# Patient Record
(demographics unavailable — no encounter records)

---

## 2024-10-10 NOTE — ASSESSMENT
[FreeTextEntry1] : Diabetes, at goal. (A1c 7.0%). Obesity BMI 36 change Trulicity to Ozempic 2mg/week to help reduce weight.  sample pen given for 0.5mg Ozempic,  first dose given today in office, take last dose of Trulicity tomorrow, next week, take 1mg of Ozempic, then titrate to 2mg once pen is finished. restart glimepiride.  Bring meter to next visit. Continue regular walking continue statin therapy flu vaccine given, R deltoid RTO 4 months

## 2024-10-10 NOTE — HISTORY OF PRESENT ILLNESS
[FreeTextEntry1] : here with daughter (she is helping translate). He did not bring meter or log book to visit.   Sugars have been ok, 1790-180s in the mornings. He is still taking Trulicity, he did not get Mounjaro or glimepiride from pharmacy. He feels hungry at night but tries not to eat much, will eat fruit. He walks every day for 30 minutes.  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 not in his bag of meds) [id number] bid, cyclobenzaprine 5mg q12h atorvastatin 20mg Xofaxan 550mg pantoprazole Contour next strips, testing one/day

## 2024-10-21 NOTE — ASSESSMENT
[FreeTextEntry1] : 46 yo M with decompensated ETOH cirrhosis (EV s/p mult bleeds, EV banding and TIPS placement 10/16/23. No ascites, HE. No HCC. MELD 8 [ABO O], liver evaluation closed d/t pt status improving, here for f/u  Cirrhosis/HCC screening MRI 6/8/24, cirr, no HCC, DUE DEC, ordered ABDL US 6/22/24, patent TIPS  Portal HTN EV, EVL, TIPS no bleed since Oct, 2023 (patent TIPS per June, 2024 doppler) HE - none Ascites - NONE  ? TIA hosp at [facility] in Sept for likely TIA will request records pt w symmetrical smile, walks balanced without difficult follows with Neurologist at [facility], has appt tomorrow  HCM COLON 1/4/24 - polyps x2, sigmoid = TA, transverse = focal hyperplastic features EGD 2/7/24 - no EV or gastric varices, repeat 6 mos for surveillance  T2 DM obesity, BMI 35 continue recs per Dr. Candelaria Forde, has f/u Dec 4th continue glimepiride 2mg daily A1c 7 (June, 2024), will get updated today Advised to keep a low carb. Diet/exercise  RTC 3 months, labs before visit.

## 2024-10-21 NOTE — HISTORY OF PRESENT ILLNESS
[de-identified] : 46 yo M hx HTN, DM, AUD with ETOH cirrhosis c/b EV and multiple GIB s/p banding and TIPS 10/16/23. No ascites. No HE. No HCC, case discussed in Selection Committee 5/10/24 and consensus to close liver eval given stable status, low MELD, here for hepatology f/u MELD 8 [ABO O]  Accompanied by Daughter (English-speaking, though used  [id number]  Recent 2 day Hosp Sept, 2024 at [facility] d/t ' R facial paralysis and R sided weakness' pt watied to go to hosp next day, brought himself, was able to walk slowly pt pt had a stroke, was referred to a Neurologist at [facility]  EV, TIPS Sober 11 yrs  SELECTION COMMITTEE 5/10/24 Consensus to close liver transplant evaluation given stable status, low MELD. Will continue to follow pt for his hepatology care and hepatoma screening.  Hospitalizations [facility] ED (6/22-6/29/23) for 7 episodes of bloody vomitus s/p EGD with banding, never showed up per discharge instructions for repeat EGD and banding 2-4 weeks later [facility] ED (October, 2023) after 8 episodes of bloody vomitus. Emergent EGD s/p 6 bands and TIPS placement 10/16/23. Rec'd 1unit PRBC.  SOCIAL , 3 children TOBACCO - never ETOH - drank liquor (vodka, whisky)/beer all day, pt does not remember how much. 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 No evidence of hepatocellular carcinoma. No ascites. Recommend TIPS duplex liver doppler to optimally assess patency of TIPS shunt catheter or consider contrast-enhanced CT  6/22/24 ABDL US cirrhosis, patent TIPS.  Colonoscopy 1/4/24 Polyp (3 mm) in the transverse colon. (Polypectomy, Biopsy). Polyp (10 mm) in the sigmoid colon. (Polypectomy, Polypectomy, Endoclip). Normal mucosa in the terminal ileum. -Small hemorrhoid. -One medium size diverticulum in ascending colon. Path:. Colon, transverse, polyp; biopsy: - Colonic mucosa with focal hyperplastic features. 2. Colon, sigmoid, polyp; biopsy: - Tubular adenoma.  EGD 2/7/24 (Fredis) PHG No EV or gastric varices rec to continue BB and repeat EGD 6 mos for surveillance  INTERVAL HX feels good denies abdl pain, NVD, fc. C {P, SOB appetite good BM 3-4xdaily, no rectal blood

## 2024-10-21 NOTE — PHYSICAL EXAM
[General Appearance - Alert] : alert [General Appearance - In No Acute Distress] : in no acute distress [PERRL With Normal Accommodation] : pupils were equal in size, round, and reactive to light [Respiration, Rhythm And Depth] : normal respiratory rhythm and effort [Auscultation Breath Sounds / Voice Sounds] : lungs were clear to auscultation bilaterally [Abdomen Soft] : soft [Abdomen Tenderness] : non-tender [Abnormal Walk] : normal gait [Oriented To Time, Place, And Person] : oriented to person, place, and time [Heart Rate And Rhythm] : heart rate was normal and rhythm regular [] : no rash [Scleral Icterus] : No Scleral Icterus [Spider Angioma] : No spider angioma(s) were observed [FreeTextEntry1] : symmetrical facial smile,

## 2024-10-21 NOTE — END OF VISIT
[FreeTextEntry3] : I have seen and examined patient at bedside. I agree with hx, ROS, physical examination, imp/suggestions as written by Ms. Tiarra Woodard NP. Please see my note. [Time Spent: ___ minutes] : I have spent [unfilled] minutes of time on the encounter which excludes teaching and separately reported services.

## 2025-01-29 NOTE — HISTORY OF PRESENT ILLNESS
[FreeTextEntry1] : 46 yo M hx HTN, DM, AUD with ETOH cirrhosis c/b EV and multiple GIB s/p banding and TIPS 10/16/23. No ascites. No HE. No HCC, case discussed in Selection Committee 5/10/24 and consensus to close liver eval given stable status, low MELD, here for hepatology f/u MELD 8 [ABO O]  1/23/25  Juan [id number] utilized for this encounter.  Patient feels well today. Denies new nausea, vomiting, hematochezia, melena, diarrhea, and constipation. Denies new abdominal distention, lower extremity edema, and encephalopathy. Has remained abstinent from alcohol. Remains adherent to medications previously prescribed. Currently describes intermittent right upper quadrant abdominal pain which his primary care provider is treating with gabapentin and cyclobenzaprine.    10/21/24 Accompanied by Daughter (English-speaking, though used  [id number]  Recent 2 day Hosp Sept, 2024 at [facility] d/t ' R facial paralysis and R sided weakness' pt watied to go to hosp next day, brought himself, was able to walk slowly pt pt had a stroke, was referred to a Neurologist at [facility]  EV, TIPS Sober 11 yrs  SELECTION COMMITTEE 5/10/24 Consensus to close liver transplant evaluation given stable status, low MELD. Will continue to follow pt for his hepatology care and hepatoma screening.  Hospitalizations [facility] ED (6/22-6/29/23) for 7 episodes of bloody vomitus s/p EGD with banding, never showed up per discharge instructions for repeat EGD and banding 2-4 weeks later [facility] ED (October, 2023) after 8 episodes of bloody vomitus. Emergent EGD s/p 6 bands and TIPS placement 10/16/23. Rec'd 1unit PRBC.  SOCIAL , 3 children TOBACCO - never ETOH - drank liquor (vodka, whisky)/beer all day, pt does not remember how much. 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 No evidence of hepatocellular carcinoma. No ascites. Recommend TIPS duplex liver doppler to optimally assess patency of TIPS shunt catheter or consider contrast-enhanced CT  6/22/24 ABDL US cirrhosis, patent TIPS.  Colonoscopy 1/4/24 Polyp (3 mm) in the transverse colon. (Polypectomy, Biopsy). Polyp (10 mm) in the sigmoid colon. (Polypectomy, Polypectomy, Endoclip). Normal mucosa in the terminal ileum. -Small hemorrhoid. -One medium size diverticulum in ascending colon. Path:. Colon, transverse, polyp; biopsy: - Colonic mucosa with focal hyperplastic features. 2. Colon, sigmoid, polyp; biopsy: - Tubular adenoma.  EGD 2/7/24 (Fredis) PHG No EV or gastric varices rec to continue BB and repeat EGD 6 mos for surveillance  INTERVAL HX feels good denies abdl pain, NVD, fc. C  {P, SOB appetite good BM 3-4xdaily, no rectal blood

## 2025-01-29 NOTE — HISTORY OF PRESENT ILLNESS
[FreeTextEntry1] : 46 yo M hx HTN, DM, AUD with ETOH cirrhosis c/b EV and multiple GIB s/p banding and TIPS 10/16/23. No ascites. No HE. No HCC, case discussed in Selection Committee 5/10/24 and consensus to close liver eval given stable status, low MELD, here for hepatology f/u MELD 8 [ABO O]  1/23/25  Juan [id number] utilized for this encounter.  Patient feels well today. Denies new nausea, vomiting, hematochezia, melena, diarrhea, and constipation. Denies new abdominal distention, lower extremity edema, and encephalopathy. Has remained abstinent from alcohol. Remains adherent to medications previously prescribed. Currently describes intermittent right upper quadrant abdominal pain which his primary care provider is treating with gabapentin and cyclobenzaprine.    10/21/24 Accompanied by Daughter (English-speaking, though used  [id number]  Recent 2 day Hosp Sept, 2024 at [facility] d/t ' R facial paralysis and R sided weakness' pt watied to go to hosp next day, brought himself, was able to walk slowly pt pt had a stroke, was referred to a Neurologist at [facility]  EV, TIPS Sober 11 yrs  SELECTION COMMITTEE 5/10/24 Consensus to close liver transplant evaluation given stable status, low MELD. Will continue to follow pt for his hepatology care and hepatoma screening.  Hospitalizations [facility] ED (6/22-6/29/23) for 7 episodes of bloody vomitus s/p EGD with banding, never showed up per discharge instructions for repeat EGD and banding 2-4 weeks later [facility] ED (October, 2023) after 8 episodes of bloody vomitus. Emergent EGD s/p 6 bands and TIPS placement 10/16/23. Rec'd 1unit PRBC.  SOCIAL , 3 children TOBACCO - never ETOH - drank liquor (vodka, whisky)/beer all day, pt does not remember how much. 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 No evidence of hepatocellular carcinoma. No ascites. Recommend TIPS duplex liver doppler to optimally assess patency of TIPS shunt catheter or consider contrast-enhanced CT  6/22/24 ABDL US cirrhosis, patent TIPS.  Colonoscopy 1/4/24 Polyp (3 mm) in the transverse colon. (Polypectomy, Biopsy). Polyp (10 mm) in the sigmoid colon. (Polypectomy, Polypectomy, Endoclip). Normal mucosa in the terminal ileum. -Small hemorrhoid. -One medium size diverticulum in ascending colon. Path:. Colon, transverse, polyp; biopsy: - Colonic mucosa with focal hyperplastic features. 2. Colon, sigmoid, polyp; biopsy: - Tubular adenoma.  EGD 2/7/24 (Fredis) PHG No EV or gastric varices rec to continue BB and repeat EGD 6 mos for surveillance  INTERVAL HX feels good denies abdl pain, NVD, fc. C  {P, SOB appetite good BM 3-4xdaily, no rectal blood

## 2025-01-29 NOTE — ASSESSMENT
[FreeTextEntry1] : 46 yo M with decompensated ETOH cirrhosis (EV s/p mult bleeds, EV banding and TIPS placement 10/16/23. No ascites, HE. No HCC. MELD 8 [ABO O], liver evaluation closed d/t pt status improving, here for f/u  Cirrhosis/HCC screening MRI 6/8/24, cirr, no HCC, will repeat ultrasound and AFP/AFP-L3/DCP today ABDL US 6/22/24, patent TIPS  Portal HTN EV, EVL, TIPS no bleed since Oct, 2023 (patent TIPS per June, 2024 doppler) HE - none Ascites - NONE  ? TIA hosp at [facility] in Sept for likely TIA will request records pt w symmetrical smile, walks balanced without difficult follows with Neurologist at [facility], has appt tomorrow  HCM COLON 1/4/24 - polyps x2, sigmoid = TA, transverse = focal hyperplastic features EGD 2/7/24 - no EV or gastric varices  T2 DM obesity, BMI 35 continue recs per Dr. Candelaria Forde, has f/u Dec 4th continue glimepiride 2mg daily A1c 7 (June, 2024), will get updated today Advised to keep a low carb. Diet/exercise  RTC 6 months  Luis Ruiz, DO Gastroenterology Fellow [facility]

## 2025-01-29 NOTE — ASSESSMENT
[FreeTextEntry1] : 48 yo M with decompensated ETOH cirrhosis (EV s/p mult bleeds, EV banding and TIPS placement 10/16/23. No ascites, HE. No HCC. MELD 8 [ABO O], liver evaluation closed d/t pt status improving, here for f/u  Cirrhosis/HCC screening MRI 6/8/24, cirr, no HCC, will repeat ultrasound and AFP/AFP-L3/DCP today ABDL US 6/22/24, patent TIPS  Portal HTN EV, EVL, TIPS no bleed since Oct, 2023 (patent TIPS per June, 2024 doppler) HE - none Ascites - NONE  ? TIA hosp at [facility] in Sept for likely TIA will request records pt w symmetrical smile, walks balanced without difficult follows with Neurologist at [facility], has appt tomorrow  HCM COLON 1/4/24 - polyps x2, sigmoid = TA, transverse = focal hyperplastic features EGD 2/7/24 - no EV or gastric varices  T2 DM obesity, BMI 35 continue recs per Dr. Candelaria Forde, has f/u Dec 4th continue glimepiride 2mg daily A1c 7 (June, 2024), will get updated today Advised to keep a low carb. Diet/exercise  RTC 6 months  Luis Ruiz, DO Gastroenterology Fellow [facility]

## 2025-02-13 NOTE — ASSESSMENT
[FreeTextEntry1] : 47 year old man with h/o DM2, obesity, HTN, EtOH cirrhosis decompensated by variceal bleeding s/p TIPS, CVA in 9/2024 here for evaluation prior to liver transplant listing, with fatigue on exertion. Suboptimal control of DM2, no family history of CVD. EKG and labs reviewed, notable for LDL at goal on atorvastatin and normal Cr. TTE was normal, DSE non-diagnostic and CCTA showed CAC 0 with normal coronary arteries. Chest discomfort resolved, did not appear to be cardiac in nature, consider GERD and gastroparesis given association with food.  - Liver transplant evaluation closed due to improving status, no further cardiac work-up at this time - Continue atorvastatin 20 mg daily  #HTN: BP at goal  #TIA/CVA: Hospitalized in [facility] with right facial paralysis and right sided weakness, was prescribed Plavix 75 mg by Neurologist Dr. Tristan Arce  #DM2: - Patient following with Endocrine for DM care, on Ozempic and glimepiride

## 2025-02-13 NOTE — REASON FOR VISIT
[FreeTextEntry1] : 47 year old man with h/o DM2, obesity, HTN, EtOH cirrhosis decompensated by variceal bleeding s/p TIPS here for follow-up. He was referred for evaluation prior to liver transplant listing in 2024. At that time, he was able to walk however feels fatigued after three to four blocks, with no chest pain or palpitations, no dyspnea. When he walks up to 20 to 30 minutes, he has pain in epigastric and substernal area. Only happens when he is walking or lifting heavy things, not when he is at rest. He also endorsed lightheadedness when anemic with GIB, denies any lightheadedness currently. Denies history of syncope, denies orthopnea, PND or VALENTINE. Subsequently had CCTA with no obstructive disease and CAC 0.   Since last visit, he has been in usual state of health, no new symptoms and his exertional tolerance has improved. Able to walk over an hour without symptoms. No issues with bleeding.   Meds Atorvastatin 20 Pantoprazole 40 mg Glimepiride 2 mg  Trulicity 3 mg Carvedilol 3.125 BID   SH Never smoker No EtOH currently No illicits or stimulants Previously worked as [employer]   No CVD/CVA/VTW

## 2025-02-13 NOTE — DISCUSSION/SUMMARY
[___ Year(s)] : in [unfilled] year(s) [EKG obtained to assist in diagnosis and management of assessed problem(s)] : EKG obtained to assist in diagnosis and management of assessed problem(s)

## 2025-02-13 NOTE — PHYSICAL EXAM
[No Acute Distress] : no acute distress [Normal Venous Pressure] : normal venous pressure [Normal S1, S2] : normal S1, S2 [No Murmur] : no murmur [No Rub] : no rub [No Gallop] : no gallop [Clear Lung Fields] : clear lung fields [Good Air Entry] : good air entry [Soft] : abdomen soft [Non Tender] : non-tender [No Edema] : no edema

## 2025-07-24 NOTE — ASSESSMENT
[FreeTextEntry1] : 49 yo M with decompensated ETOH cirrhosis (EV s/p mult bleeds, EV banding and TIPS placement 10/16/23. No ascites, HE. No HCC. MELD 8 [ABO O], liver evaluation closed d/t pt status improving, here for f/u  Cirrhosis/HCC screening MRI 6/8/24, cirr, no HCC, will order AFP (paper script given) to be done w/ his other labs. Per pt next MRI scheduled  Portal HTN EV, EVL, TIPS no bleed since Oct, 2023 (patent TIPS per June, 2024 doppler) HE - no confusion Ascites - NONE  ? TIA hosp at [facility] in Sept for likely TIA will request records pt w symmetrical smile, walks balanced without difficult follows with Neurologist at [facility], has appt tomorrow  HCM COLON 1/4/24 - polyps x2, sigmoid = TA, transverse = focal hyperplastic features EGD 2/7/24 - no EV or gastric varices  T2 DM obesity, BMI 35 continue recs per [place], Dr. Gaona, has f/u Dec 4th continue glimepiride 2mg daily A1c 7 (June, 2024), will get updated today Advised to keep a low carb. Diet/exercise  As transplant eval now closed, pt can continue to f/u with Dr. Mina since he lives in [place]

## 2025-07-24 NOTE — HISTORY OF PRESENT ILLNESS
[FreeTextEntry1] : 47 yo M hx HTN, DM, AUD with ETOH cirrhosis c/b EV and multiple GIB s/p banding and TIPS 10/16/23. No ascites. No HE. No HCC, case discussed in Selection Committee 5/10/24 and consensus to close liver eval given stable status, low MELD, here for hepatology f/u MELD 8 [ABO O]  Interval 7/24/25:  used for this encounter.  Overall doing well, denies confusion, LE edema, abd edema. Reports similar mild intermittent abdominal nerve-type pain. Having 4-5 BMs/day. Saw PCP 2 days ago with CBC and CMP ordered. Reports he has MRI liver scheduled and will see Dr. Mina afterwards.  SELECTION COMMITTEE 5/10/24 Consensus to close liver transplant evaluation given stable status, low MELD. Will continue to follow pt for his hepatology care and hepatoma screening.  Hospitalizations [facility] ED (6/22-6/29/23) for 7 episodes of bloody vomitus s/p EGD with banding, never showed up per discharge instructions for repeat EGD and banding 2-4 weeks later [facility] ED (October, 2023) after 8 episodes of bloody vomitus. Emergent EGD s/p 6 bands and TIPS placement 10/16/23. Rec'd 1unit PRBC.  SOCIAL , 3 children TOBACCO - never ETOH - drank liquor (vodka, whisky)/beer all day, pt does not remember how much. 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 No evidence of hepatocellular carcinoma. No ascites. Recommend TIPS duplex liver doppler to optimally assess patency of TIPS shunt catheter or consider contrast-enhanced CT  6/22/24 ABDL US cirrhosis, patent TIPS.  Colonoscopy 1/4/24 Polyp (3 mm) in the transverse colon. (Polypectomy, Biopsy). Polyp (10 mm) in the sigmoid colon. (Polypectomy, Polypectomy, Endoclip). Normal mucosa in the terminal ileum. -Small hemorrhoid. -One medium size diverticulum in ascending colon. Path:. Colon, transverse, polyp; biopsy: - Colonic mucosa with focal hyperplastic features. 2. Colon, sigmoid, polyp; biopsy: - Tubular adenoma.  EGD 2/7/24 (Fredis) PHG No EV or gastric varices rec to continue BB and repeat EGD 6 mos for surveillance

## 2025-07-24 NOTE — PHYSICAL EXAM
[General Appearance - Alert] : alert [] : no respiratory distress [Edema] : there was no peripheral edema [FreeTextEntry1] : soft, NT, ND